# Patient Record
Sex: FEMALE | Race: WHITE | NOT HISPANIC OR LATINO | Employment: OTHER | URBAN - METROPOLITAN AREA
[De-identification: names, ages, dates, MRNs, and addresses within clinical notes are randomized per-mention and may not be internally consistent; named-entity substitution may affect disease eponyms.]

---

## 2018-06-29 ENCOUNTER — HOSPITAL ENCOUNTER (EMERGENCY)
Facility: HOSPITAL | Age: 83
Discharge: OTHER INSTITUTION | End: 2018-06-29
Attending: EMERGENCY MEDICINE
Payer: MEDICARE

## 2018-06-29 VITALS
HEIGHT: 61 IN | OXYGEN SATURATION: 96 % | RESPIRATION RATE: 20 BRPM | TEMPERATURE: 98 F | WEIGHT: 111 LBS | HEART RATE: 75 BPM | DIASTOLIC BLOOD PRESSURE: 62 MMHG | SYSTOLIC BLOOD PRESSURE: 135 MMHG | BODY MASS INDEX: 20.96 KG/M2

## 2018-06-29 DIAGNOSIS — S05.92XA: ICD-10-CM
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
## 2018-06-29 DIAGNOSIS — E16.2 HYPOGLYCEMIA: ICD-10-CM

## 2018-06-29 DIAGNOSIS — S09.90XA HEAD TRAUMA: ICD-10-CM

## 2018-06-29 DIAGNOSIS — S12.001A CLOSED NONDISPLACED FRACTURE OF FIRST CERVICAL VERTEBRA, UNSPECIFIED FRACTURE MORPHOLOGY, INITIAL ENCOUNTER: ICD-10-CM

## 2018-06-29 DIAGNOSIS — S12.101A CLOSED NONDISPLACED FRACTURE OF SECOND CERVICAL VERTEBRA, UNSPECIFIED FRACTURE MORPHOLOGY, INITIAL ENCOUNTER: ICD-10-CM

## 2018-06-29 LAB
ALBUMIN SERPL BCP-MCNC: 3.7 G/DL
ALP SERPL-CCNC: 107 U/L
ALT SERPL W/O P-5'-P-CCNC: 18 U/L
ANION GAP SERPL CALC-SCNC: 16 MMOL/L
APTT BLDCRRT: 22.4 SEC
AST SERPL-CCNC: 24 U/L
BASOPHILS # BLD AUTO: 0.04 K/UL
BASOPHILS NFR BLD: 0.3 %
BILIRUB SERPL-MCNC: 0.6 MG/DL
BNP SERPL-MCNC: 51 PG/ML
BUN SERPL-MCNC: 28 MG/DL
CALCIUM SERPL-MCNC: 10.1 MG/DL
CHLORIDE SERPL-SCNC: 102 MMOL/L
CK MB SERPL-MCNC: 1.9 NG/ML
CK MB SERPL-RTO: 1.6 %
CK SERPL-CCNC: 118 U/L
CK SERPL-CCNC: 118 U/L
CO2 SERPL-SCNC: 22 MMOL/L
CREAT SERPL-MCNC: 1.3 MG/DL
DIFFERENTIAL METHOD: ABNORMAL
EOSINOPHIL # BLD AUTO: 0.2 K/UL
EOSINOPHIL NFR BLD: 1 %
ERYTHROCYTE [DISTWIDTH] IN BLOOD BY AUTOMATED COUNT: 13.5 %
EST. GFR  (AFRICAN AMERICAN): 45 ML/MIN/1.73 M^2
EST. GFR  (NON AFRICAN AMERICAN): 39 ML/MIN/1.73 M^2
GLUCOSE SERPL-MCNC: 86 MG/DL
HCT VFR BLD AUTO: 41.3 %
HGB BLD-MCNC: 14.8 G/DL
INR PPP: 1
LYMPHOCYTES # BLD AUTO: 3 K/UL
LYMPHOCYTES NFR BLD: 20 %
MCH RBC QN AUTO: 33.4 PG
MCHC RBC AUTO-ENTMCNC: 35.8 G/DL
MCV RBC AUTO: 93 FL
MONOCYTES # BLD AUTO: 2 K/UL
MONOCYTES NFR BLD: 13.8 %
NEUTROPHILS # BLD AUTO: 9.6 K/UL
NEUTROPHILS NFR BLD: 64.9 %
PLATELET # BLD AUTO: 223 K/UL
PMV BLD AUTO: 10 FL
POCT GLUCOSE: 103 MG/DL (ref 70–110)
POTASSIUM SERPL-SCNC: 3.2 MMOL/L
PROT SERPL-MCNC: 7 G/DL
PROTHROMBIN TIME: 10.7 SEC
RBC # BLD AUTO: 4.43 M/UL
SODIUM SERPL-SCNC: 140 MMOL/L
TROPONIN I SERPL DL<=0.01 NG/ML-MCNC: 0.02 NG/ML
WBC # BLD AUTO: 14.78 K/UL

## 2018-06-29 PROCEDURE — 82962 GLUCOSE BLOOD TEST: CPT

## 2018-06-29 PROCEDURE — 85730 THROMBOPLASTIN TIME PARTIAL: CPT

## 2018-06-29 PROCEDURE — 82553 CREATINE MB FRACTION: CPT

## 2018-06-29 PROCEDURE — 83880 ASSAY OF NATRIURETIC PEPTIDE: CPT

## 2018-06-29 PROCEDURE — 80053 COMPREHEN METABOLIC PANEL: CPT

## 2018-06-29 PROCEDURE — 63600175 PHARM REV CODE 636 W HCPCS: Performed by: EMERGENCY MEDICINE

## 2018-06-29 PROCEDURE — 85025 COMPLETE CBC W/AUTO DIFF WBC: CPT

## 2018-06-29 PROCEDURE — 93005 ELECTROCARDIOGRAM TRACING: CPT

## 2018-06-29 PROCEDURE — 85610 PROTHROMBIN TIME: CPT

## 2018-06-29 PROCEDURE — 82550 ASSAY OF CK (CPK): CPT

## 2018-06-29 PROCEDURE — 96374 THER/PROPH/DIAG INJ IV PUSH: CPT

## 2018-06-29 PROCEDURE — 93010 ELECTROCARDIOGRAM REPORT: CPT | Mod: ,,, | Performed by: INTERNAL MEDICINE

## 2018-06-29 PROCEDURE — 84484 ASSAY OF TROPONIN QUANT: CPT

## 2018-06-29 PROCEDURE — 99285 EMERGENCY DEPT VISIT HI MDM: CPT

## 2018-06-29 PROCEDURE — 80162 ASSAY OF DIGOXIN TOTAL: CPT

## 2018-06-29 RX ORDER — PREDNISONE 5 MG/1
5 TABLET ORAL DAILY
COMMUNITY

## 2018-06-29 RX ORDER — DILTIAZEM HYDROCHLORIDE 300 MG/1
300 CAPSULE, COATED, EXTENDED RELEASE ORAL DAILY
COMMUNITY

## 2018-06-29 RX ORDER — INSULIN ASPART 100 [IU]/ML
14 INJECTION, SOLUTION INTRAVENOUS; SUBCUTANEOUS
COMMUNITY

## 2018-06-29 RX ORDER — ONDANSETRON 2 MG/ML
4 INJECTION INTRAMUSCULAR; INTRAVENOUS
Status: COMPLETED | OUTPATIENT
Start: 2018-06-29 | End: 2018-06-29

## 2018-06-29 RX ADMIN — ONDANSETRON 4 MG: 2 INJECTION INTRAMUSCULAR; INTRAVENOUS at 09:06

## 2018-06-30 LAB — DIGOXIN SERPL-MCNC: 0.5 NG/ML

## 2018-06-30 NOTE — ED NOTES
Unable to obtain weight from patient during triage process. Please get weight from patient when they are on ER stretcher.

## 2018-06-30 NOTE — ED PROVIDER NOTES
SCRIBE #1 NOTE: I, Jocelynn Teresa, am scribing for, and in the presence of, Issac Randhawa MD. I have scribed the entire note.      History      Chief Complaint   Patient presents with    Fall     pt fell checking glucose. laceration above left eye.    Hypoglycemia     glucose was 23, 172 after amp of D50 administration.       Review of patient's allergies indicates:  No Known Allergies     HPI   HPI    6/29/2018, 9:12 PM   History obtained from the patient      History of Present Illness: Jennifer Marin is a 82 y.o. female patient with a PMHx of hypoglycemia who presents to the Emergency Department for an evaluation after a syncopal episode which occurred PTA. Symptoms are episodic and moderate in severity. No mitigating or exacerbating factors reported. Pt is c/o head injury/trauma, laceration to L eye/ L eyebrow, and n/v which onset after fall. Patient denies any fever, HA, dizziness, back pain, neck pain, abd pain, CP, SOB, weakness/numbness, and all other sxs at this time. Per EMS, pt's glucose was 23 then 172 after amp of D50 administration. No further complaints or concerns at this time.         Arrival mode: EMS      PCP: Primary Doctor No       Past Medical History:  Past Medical History:   Diagnosis Date    Coronary artery disease     Diabetes mellitus        Past Surgical History:  History reviewed. No pertinent surgical history.      Family History:  History reviewed. No pertinent family history.    Social History:  Social History     Social History Main Topics    Smoking status: Never Smoker    Smokeless tobacco: Unknown    Alcohol use Unknown    Drug use: Unknown    Sexual activity: Unknown       ROS   Review of Systems   Constitutional: Negative for chills and fever.   HENT: Negative for sore throat.    Eyes:        + L eye laceration   Respiratory: Negative for shortness of breath.    Cardiovascular: Negative for chest pain.   Gastrointestinal: Positive for nausea and  vomiting. Negative for abdominal pain.   Genitourinary: Negative for dysuria.   Musculoskeletal: Negative for back pain and neck pain.   Skin: Negative for rash.   Neurological: Positive for syncope. Negative for dizziness, weakness, numbness and headaches.        + head injury/trauma   Hematological: Does not bruise/bleed easily.   All other systems reviewed and are negative.      Physical Exam      Initial Vitals [06/29/18 2111]   BP Pulse Resp Temp SpO2   (!) 178/62 95 18 98.2 °F (36.8 °C) 100 %      MAP       --          Physical Exam  Nursing Notes and Vital Signs Reviewed.  Constitutional: Patient is in mild distress. Well-developed and well-nourished.  Head: Atraumatic. Normocephalic. Blood to L side of face.  Eyes: PERRL. EOM intact. Conjunctivae are not pale. No scleral icterus. Laceration over L cornea with protruding material.  ENT: Mucous membranes are moist. Oropharynx is clear and symmetric.    Neck: Supple. Full ROM. No lymphadenopathy.  Cardiovascular: Regular rate. Regular rhythm. No murmurs, rubs, or gallops. Distal pulses are 2+ and symmetric.  Pulmonary/Chest: No respiratory distress. Clear to auscultation bilaterally. No wheezing or rales.  Abdominal: Soft and non-distended.  There is no tenderness.  No rebound, guarding, or rigidity.   Genitourinary: No CVA tenderness  Musculoskeletal: Moves all extremities. No obvious deformities. No edema. No calf tenderness.   Skin: Warm and dry.   Neurological:  Alert, awake, and appropriate.  Normal speech.  No acute focal neurological deficits are appreciated.  Psychiatric: Normal affect. Good eye contact. Appropriate in content.    ED Course    Critical Care  Date/Time: 6/29/2018 10:00 PM  Performed by: DAISY CHONG  Authorized by: DAISY CHONG   Direct patient critical care time: 18 minutes  Additional history critical care time: 6 minutes  Ordering / reviewing critical care time: 10 minutes  Documentation critical care time: 6  "minutes  Consulting other physicians critical care time: 5 minutes  Total critical care time (exclusive of procedural time) : 45 minutes  Critical care time was exclusive of separately billable procedures and treating other patients and teaching time.  Critical care was necessary to treat or prevent imminent or life-threatening deterioration of the following conditions: trauma.  Critical care was time spent personally by me on the following activities: blood draw for specimens, discussions with consultants, evaluation of patient's response to treatment, obtaining history from patient or surrogate, ordering and review of laboratory studies, pulse oximetry, review of old charts, development of treatment plan with patient or surrogate, interpretation of cardiac output measurements, examination of patient, ordering and performing treatments and interventions, ordering and review of radiographic studies and re-evaluation of patient's condition.        ED Vital Signs:  Vitals:    06/29/18 2111 06/29/18 2124 06/29/18 2131 06/29/18 2132   BP: (!) 178/62  132/64    Pulse: 95  81 78   Resp: 18  (!) 22    Temp: 98.2 °F (36.8 °C)      TempSrc: Oral      SpO2: 100%  100%    Weight:  50.3 kg (111 lb)     Height:  5' 1" (1.549 m)      06/29/18 2217 06/29/18 2227 06/29/18 2236   BP: 135/62 135/62 135/62   Pulse: 77 75 75   Resp: (!) 25 20 20   Temp:  98 °F (36.7 °C) 98 °F (36.7 °C)   TempSrc:      SpO2: 99% 96%    Weight:      Height:          Abnormal Lab Results:  Labs Reviewed   CBC W/ AUTO DIFFERENTIAL - Abnormal; Notable for the following:        Result Value    WBC 14.78 (*)     MCH 33.4 (*)     Gran # (ANC) 9.6 (*)     Mono # 2.0 (*)     All other components within normal limits   COMPREHENSIVE METABOLIC PANEL - Abnormal; Notable for the following:     Potassium 3.2 (*)     CO2 22 (*)     BUN, Bld 28 (*)     eGFR if  45 (*)     eGFR if non  39 (*)     All other components within normal limits "   TROPONIN I   PROTIME-INR   APTT   B-TYPE NATRIURETIC PEPTIDE   CK-MB   CK   DIGOXIN LEVEL   URINALYSIS   POCT GLUCOSE        All Lab Results:  Results for orders placed or performed during the hospital encounter of 06/29/18   CBC auto differential   Result Value Ref Range    WBC 14.78 (H) 3.90 - 12.70 K/uL    RBC 4.43 4.00 - 5.40 M/uL    Hemoglobin 14.8 12.0 - 16.0 g/dL    Hematocrit 41.3 37.0 - 48.5 %    MCV 93 82 - 98 fL    MCH 33.4 (H) 27.0 - 31.0 pg    MCHC 35.8 32.0 - 36.0 g/dL    RDW 13.5 11.5 - 14.5 %    Platelets 223 150 - 350 K/uL    MPV 10.0 9.2 - 12.9 fL    Gran # (ANC) 9.6 (H) 1.8 - 7.7 K/uL    Lymph # 3.0 1.0 - 4.8 K/uL    Mono # 2.0 (H) 0.3 - 1.0 K/uL    Eos # 0.2 0.0 - 0.5 K/uL    Baso # 0.04 0.00 - 0.20 K/uL    Gran% 64.9 38.0 - 73.0 %    Lymph% 20.0 18.0 - 48.0 %    Mono% 13.8 4.0 - 15.0 %    Eosinophil% 1.0 0.0 - 8.0 %    Basophil% 0.3 0.0 - 1.9 %    Differential Method Automated    Comprehensive metabolic panel   Result Value Ref Range    Sodium 140 136 - 145 mmol/L    Potassium 3.2 (L) 3.5 - 5.1 mmol/L    Chloride 102 95 - 110 mmol/L    CO2 22 (L) 23 - 29 mmol/L    Glucose 86 70 - 110 mg/dL    BUN, Bld 28 (H) 8 - 23 mg/dL    Creatinine 1.3 0.5 - 1.4 mg/dL    Calcium 10.1 8.7 - 10.5 mg/dL    Total Protein 7.0 6.0 - 8.4 g/dL    Albumin 3.7 3.5 - 5.2 g/dL    Total Bilirubin 0.6 0.1 - 1.0 mg/dL    Alkaline Phosphatase 107 55 - 135 U/L    AST 24 10 - 40 U/L    ALT 18 10 - 44 U/L    Anion Gap 16 8 - 16 mmol/L    eGFR if African American 45 (A) >60 mL/min/1.73 m^2    eGFR if non African American 39 (A) >60 mL/min/1.73 m^2   Troponin I   Result Value Ref Range    Troponin I 0.022 0.000 - 0.026 ng/mL   Protime-INR   Result Value Ref Range    Prothrombin Time 10.7 9.0 - 12.5 sec    INR 1.0 0.8 - 1.2   APTT   Result Value Ref Range    aPTT 22.4 21.0 - 32.0 sec   Brain natriuretic peptide   Result Value Ref Range    BNP 51 0 - 99 pg/mL   CK-MB   Result Value Ref Range     20 - 180 U/L    CPK MB 1.9  0.1 - 6.5 ng/mL    MB% 1.6 0.0 - 5.0 %   CK   Result Value Ref Range     20 - 180 U/L   POCT glucose   Result Value Ref Range    POCT Glucose 103 70 - 110 mg/dL         Imaging Results:  Imaging Results          CT Maxillofacial Without Contrast (Final result)  Result time 06/29/18 22:30:07    Final result by Luan Gomez MD (06/29/18 22:30:07)                 Impression:      1.  Left periorbital soft tissue swelling.  Nearly diffuse high density in the left globe, concerning for acute hemorrhage into the left globe.  Clinical correlation is highly advised.    2.  Negative for facial bone fractures.    3.  Nonemergent findings as described above.      Electronically signed by: Luan Gomez MD  Date:    06/29/2018  Time:    22:30             Narrative:    EXAMINATION:  CT MAXILLOFACIAL WITHOUT CONTRAST    CLINICAL HISTORY:  Facial trauma, Fx suspected Head trauma, minor, GCS>=13, NOC/NEXUS/CCR neg, first study DX:  S09.90XA Unspecified injury of head, initial encounter    TECHNIQUE:  Low dose axial images, sagittal and coronal reformations were obtained through the face.  Contrast was not administered.    COMPARISON:  None    FINDINGS:  The paranasal sinuses are clear.  Mastoid air cells are clear.  Rightward nasal septal deviation.    Significant streak artifact from dental amalgam could obscure subtle abnormalities to the maxilla or mandible.  These osseous structures appear to be intact.  Rest of the facial bones are intact.    Newly diffuse high density throughout the left globe with overlying soft tissue swelling of the left periorbital soft tissues.  An acute injury to the left lobe is difficult to exclude.  Right globe is intact.  Postoperative changes to the right lens.    Carotid artery calcifications noted.  The visualized soft tissues of the neck are otherwise normal.                               CT Cervical Spine Without Contrast (Final result)  Result time 06/29/18 22:36:43    Final result  by Luan Gomez MD (06/29/18 22:36:43)                 Impression:      1.  There is a subtle lucency involving the anterior arch of C1.  There is an oblique lucency involving the right base of the dens.  Subtle, nondisplaced fractures are difficult to exclude.    2.  Marked degenerative facet arthropathy with grade 1 anterior spondylolisthesis of C4 on C5.  Fusion of the C5/C6 disc.  Marked degenerative disc changes at C6/C7-T1/T2.  Mild multifactorial spinal canal stenosis at C5/C6.    3.  Nonemergent findings as described above.    4.  A call report was made to Dr. Randhawa at 2235 hours on June 29, 2018.    All CT scans at this facility used dose modulation, iterative reconstruction, and/or weight based dosing when appropriate to reduce radiation dose to as low as reasonably achievable.      Electronically signed by: Luan Gomez MD  Date:    06/29/2018  Time:    22:36             Narrative:    EXAMINATION:  CT CERVICAL SPINE WITHOUT CONTRAST    CLINICAL HISTORY:  Head trauma;Facial trauma, Fx suspected Head trauma, minor, GCS>=13, NOC/NEXUS/CCR neg, first study DX:  S09.90XA Unspecified injury of head, initial encounter    TECHNIQUE:  Axial noncontrast CT scan of the cervical spine with sagittal and coronal reconstructions.    COMPARISON:  No comparison studies are available.    FINDINGS:  There is a subtle lucency involving the anterior arch of C1, with moderate degenerative changes between the anterior arch of C1 and the dens.  A subtle, nondisplaced fractures difficult to exclude.  There appears to be possible oblique fracture through the right base of the dens, without involvement of the left base the dens.  A subtle nondisplaced fracture in this location is difficult to exclude as well.  There is fusion of the C5 and C6 vertebral bodies with marked degenerative disc changes at C6/C7 and T1/T2.  Marked degenerative facet arthropathy of the upper cervical spine with grade 1 anterior spondylolisthesis of  C4 on C5.  Alignment of the cervical spine is otherwise unremarkable.  Thecal sac is patent with mild to moderate multifactorial spinal canal stenosis at C5/C6.    No other osseous abnormalities are seen.    The surrounding neck soft tissues are normal.  The lung apices are clear.  The thyroid gland is normal.  The visualized portions of the brain and posterior fossa is normal.                               CT Head Without Contrast (Final result)  Result time 06/29/18 22:26:51    Final result by Luan Gomez MD (06/29/18 22:26:51)                 Impression:      1.  Motion artifact is present on a number of images.  Subtle abnormalities could be overlooked.  Negative for definite acute intracranial process. Negative for hemorrhage, or skull fracture.    2.  Nearly diffuse high density throughout the left globe, of unknown acuity.  Clinical correlation is advised to ensure that this is not an acute injury to the left globe.    3.  Atrophy, intracranial atherosclerotic disease and small vessel ischemic changes.    4.  Nonemergent findings as described above.    All CT scans at this facility used dose modulation, iterative reconstruction, and/or weight based dosing when appropriate to reduce radiation dose to as low as reasonably achievable.      Electronically signed by: Luan Gomez MD  Date:    06/29/2018  Time:    22:26             Narrative:    EXAMINATION:  CT HEAD WITHOUT CONTRAST    CLINICAL HISTORY:  Unspecified injury of head, initial encounterFacial trauma, Fx suspected;Head trauma, minor, GCS>=13, NOC/NEXUS/CCR neg, first study;    TECHNIQUE:  Axial images through the brain and posterior fossa were obtained without the use of IV contrast.  Sagittal and coronal reconstructions are provided for review.    COMPARISON:  No comparison studies are available.    FINDINGS:  Motion artifact is present on a number of images.  Subtle abnormalities could be overlooked.  Repeat images were obtained.  The ventricles  are midline and the CSF spaces are prominent.  The gray-white matter junction is well preserved. Negative for intracranial vascular abnormalities. Negative for mass, mass effect, cerebral edema, hemorrhage or abnormal fluid collections.  Intracranial atherosclerotic disease.  Small vessel ischemic changes.  Falx calcifications.  Arachnoid granulation calcifications.    The skull and scalp are intact.  Mild rightward nasal septal deviation.  The   paranasal sinuses, mastoid air cells, middle ears and ear canals are clear.    There is diffuse high density throughout the left globe, of unclear acuity.  Postoperative changes to the right lens.  The right globe is intact.                               X-Ray Chest 1 View (Final result)  Result time 06/29/18 21:53:21    Final result by Sridhar Espinoza MD (06/29/18 21:53:21)                 Impression:      Deformity of the right ribs is present.  This may represent chronic change.  If clinically indicated repeat examination with EKG electrodes removed or right rib series may help to determine if these are chronic or recent fractures.      Electronically signed by: Saul Espinoza MD  Date:    06/29/2018  Time:    21:53             Narrative:    EXAMINATION:  XR CHEST 1 VIEW    CLINICAL HISTORY:  fall;    TECHNIQUE:  Single frontal view of the chest was performed.    COMPARISON:  None    FINDINGS:  The heart is normal in size.  There is atherosclerosis of the thoracic aorta.  The lungs appear free of acute infiltrate or pneumothorax.  The right chest wall is abnormal with a pattern of deformity of the ribs involving the 2nd 3rd 4th 5th 6th and 7th ribs.  Artifact from EKG electrode overlies this area.  The pattern suggests these are chronic rib injuries.                               The EKG was ordered, reviewed, and independently interpreted by the ED provider.  Interpretation time: 2132  Rate: 80 BPM  Rhythm: normal sinus rhythm  Interpretation: Possible left atrial  enlargement. Nonspecific T wave abnormality. No STEMI.             The Emergency Provider reviewed the vital signs and test results, which are outlined above.    ED Discussion     9:32 PM: There are no opthalmology services. Will call [facility] to transfer patient for facial trauma with  open globe and cervical fractures.    9:28 PM: Re-evaluated pt. Informed pt and family that there are no opthalmology services available at this time. I have discussed test results, shared treatment plan, and the need for transfer with patient. All historical, clinical, radiographic, and laboratory findings were reviewed with the patient/family in detail. [facility] route. Patient understands that there could be unforeseen motor vehicle accidents or loss of vital signs that could result in potential death or permanent disability. Pt expresses understanding at this time and agree with all information. All questions answered. Pt and family have no further questions or concerns at this time. Pt is ready for transfer.       9:47 PM: Consult with Dr. Hogan (Emergency Medicine) at [facility] concerning pt. There are no opthalmology services, which the patient requires, offered at [facility] at this time. Dr. Hogan expresses understanding and will accept transfer for ocular trauma.  Accepting Facility: [facility]  Accepting Physician: Dr. Hogan    10:16 PM: Re-evaluated pt. Pt is resting comfortably and is in no acute distress.  A metal patch was placed to patient's L eye with no complications.  D/w pt all pertinent results. D/w pt any concerns expressed at this time. Answered all questions. Pt expresses understanding at this time.    10:40 PM: [facility] placed C-collar on patient upon arrival for transfer.    ED Medication(s):  Medications   sodium chloride 0.9% bolus 1,000 mL (not administered)   ondansetron injection 4 mg (4 mg Intravenous Given 6/29/18 2138)       New  Prescriptions    No medications on file             Medical Decision Making    Medical Decision Making:   Clinical Tests:   Lab Tests: Ordered and Reviewed  Radiological Study: Ordered and Reviewed  Medical Tests: Ordered and Reviewed           Scribe Attestation:   Scribe #1: I performed the above scribed service and the documentation accurately describes the services I performed. I attest to the accuracy of the note.    Attending:   Physician Attestation Statement for Scribe #1: I, Issac Randhawa MD, personally performed the services described in this documentation, as scribed by Jocelynn Moore, in my presence, and it is both accurate and complete.          Clinical Impression       ICD-10-CM ICD-9-CM   1. Fall W19.XXXA E888.9   2. Head trauma S09.90XA 959.01   3. Hypoglycemia E16.2 251.2   4. Ocular trauma, left eye, initial encounter S05.92XA 921.9   5. Closed nondisplaced fracture of first cervical vertebra, unspecified fracture morphology, initial encounter S12.001A 805.01   6. Closed nondisplaced fracture of second cervical vertebra, unspecified fracture morphology, initial encounter S12.101A 805.02       Disposition:   Disposition: Transferred  Condition: Serious         Issac Randhawa MD  06/30/18 0358

## 2023-01-31 NOTE — ED NOTES
emt applied c collar   Home Suture Removal Text: Patient was provided instructions on removing sutures and will remove their sutures at home.  If they have any questions or difficulties they will call the office.